# Patient Record
Sex: MALE | Race: WHITE
[De-identification: names, ages, dates, MRNs, and addresses within clinical notes are randomized per-mention and may not be internally consistent; named-entity substitution may affect disease eponyms.]

---

## 2018-09-18 ENCOUNTER — HOSPITAL ENCOUNTER (INPATIENT)
Dept: HOSPITAL 80 - FED | Age: 73
LOS: 5 days | Discharge: HOME | DRG: 271 | End: 2018-09-23
Attending: INTERNAL MEDICINE | Admitting: INTERNAL MEDICINE
Payer: COMMERCIAL

## 2018-09-18 DIAGNOSIS — E87.5: ICD-10-CM

## 2018-09-18 DIAGNOSIS — I25.10: ICD-10-CM

## 2018-09-18 DIAGNOSIS — E11.9: ICD-10-CM

## 2018-09-18 DIAGNOSIS — I21.3: Primary | ICD-10-CM

## 2018-09-18 DIAGNOSIS — Z23: ICD-10-CM

## 2018-09-18 DIAGNOSIS — E87.1: ICD-10-CM

## 2018-09-18 DIAGNOSIS — E78.5: ICD-10-CM

## 2018-09-18 DIAGNOSIS — I10: ICD-10-CM

## 2018-09-18 LAB — PLATELET # BLD: 272 10^3/UL (ref 150–400)

## 2018-09-18 PROCEDURE — C1887 CATHETER, GUIDING: HCPCS

## 2018-09-18 PROCEDURE — C1769 GUIDE WIRE: HCPCS

## 2018-09-18 PROCEDURE — G0378 HOSPITAL OBSERVATION PER HR: HCPCS

## 2018-09-18 PROCEDURE — C9600 PERC DRUG-EL COR STENT SING: HCPCS

## 2018-09-18 PROCEDURE — C1874 STENT, COATED/COV W/DEL SYS: HCPCS

## 2018-09-18 PROCEDURE — C1725 CATH, TRANSLUMIN NON-LASER: HCPCS

## 2018-09-18 RX ADMIN — CARVEDILOL SCH MG: 3.12 TABLET, FILM COATED ORAL at 18:18

## 2018-09-18 RX ADMIN — TEMAZEPAM PRN MG: 15 CAPSULE ORAL at 22:42

## 2018-09-18 RX ADMIN — AMOXICILLIN AND CLAVULANATE POTASSIUM SCH MG: 500; 125 TABLET, FILM COATED ORAL at 20:29

## 2018-09-18 RX ADMIN — INSULIN HUMAN SCH UNIT: 100 INJECTION, SOLUTION PARENTERAL at 20:40

## 2018-09-18 NOTE — PDDXCAT
Diagnostic Cath Note





- .


Date: 09/18/18


: Yoana


Indication: CCC Class III and IV angina on medical treatment





- Procedure


Access: right groin


Procedure: left heart catheterization


Patient Problems: 


 Problems











Problem Status Onset


 


Acute coronary syndrome Acute  


 


Dyspnea Acute  


 


STEMI (ST elevation myocardial infarction) Acute

## 2018-09-18 NOTE — EDPHY
H & P


Stated Complaint: SOB for 1.5 weak with intermittent CP


Time Seen by Provider: 09/18/18 12:40





- Medical/Surgical History


Hx Asthma: No


Hx Chronic Respiratory Disease: No


Hx Diabetes: No


Hx Cardiac Disease: Yes


Hx Renal Disease: No


Hx Cirrhosis: No


Hx Alcoholism: No


Hx HIV/AIDS: No


Hx Splenectomy or Spleen Trauma: No


Other PMH: DM, hyperlipidemia, CAD





- Social History


Smoking Status: Never smoked


Constitutional: 


 Initial Vital Signs











Temperature (C)  36.6 C   09/18/18 12:19


 


Heart Rate  84   09/18/18 12:19


 


Respiratory Rate  18   09/18/18 12:19


 


Blood Pressure  146/72 H  09/18/18 12:19


 


O2 Sat (%)  95   09/18/18 12:19








 











O2 Delivery Mode               Room Air














Allergies/Adverse Reactions: 


 





Sulfa (Sulfonamide Antibiotics) Allergy (Verified 09/18/18 12:18)


 


tetracycline [Tetracycline] Allergy (Verified 09/18/18 12:18)


 


contrast Allergy (Uncoded 02/05/14 09:02)


 








Home Medications: 














 Medication  Instructions  Recorded


 


AMOXICILLIN  09/18/18


 


Atorvastatin Calcium  09/18/18


 


Insulin Regular, Human  09/18/18


 


Quinapril HCl  09/18/18














Medical Decision Making





- Diagnostics


Imaging Results: 


 Imaging Impressions





Chest X-Ray  09/18/18 12:49


Impression: Features most suggestive of congestive heart failure with 

cardiogenic pulmonary edema.











Imaging: I viewed and interpreted images myself


ED Course/Re-evaluation: 





CHIEF COMPLAINT: Dyspnea 





HISTORY OF PRESENT ILLNESS:  





This patient is a 72 year old male with history of CAD, hyperlipidemia, and 

diabetes mellitus who has undergone prior cardiac catheterization in 2014. He 

complains of 10 days of worsening dyspnea with exertion. He is generally very 

active and goes jogging daily. Over the past 10 days, he states he cannot even 

walk without having to sit and recover, which is very unusual for him. He 

endorses "pushback" from his body when he tries to exert himself even mildly. 

He denies any sharp chest pains and is unable to articulate exactly what he 

means by "pushback", but does admit this is a sensation of discomfort, 

particularly in his chest. Last night, he had difficulty sleeping due to his 

dyspnea and discomfort, and he was diaphoretic at that time. He endorses 

history of non-obstructive coronary artery disease and underwent cardiac 

catheterization in February, 2014, with Dr. Mera, cardiologist. He did not 

have any stents placed at that time. The patient is currently symptom free and 

denies any shortness of breath or chest discomfort. No fever, nausea, headache, 

diarrhea, urinary complaints, or other associated symptoms. 





REVIEW OF SYSTEMS:  





A comprehensive 10 system review of systems is otherwise negative aside from 

elements mentioned in the history of present illness and medical decision 

making.





PHYSICAL EXAM:  





HR, BP, O2 Sat, RR.  Temp noted


General Appearance:  Alert, well hydrated, appropriate, and non-toxic appearing.


Head:  Atraumatic without scalp tenderness or obvious injury


Eyes:  Pupils equal, round, reactive to light and accommodation, EOMI, no trauma

, no injection.


Ears:  Clear bilaterally, no perforation, normal landmarks


Nose:  Atraumatic, no rhinorrhea, clear.


Throat:  There is no erythema or exudates, no lesions, normal tonsils, mucus 

membranes moist.


Neck:  Supple, nontender, no lymphadenopathy.


Respiratory:  No retractions, no distress, no wheezes, and no accessory muscle 

use.  Lungs are clear to auscultation bilaterally.


Cardiovascular:  Regular rate and rhythm, no murmurs, rubs, or gallops. 

Bilateral carotid, radial, dorsalis pedis, and posterior tibial pulses intact. 

Good capillary refill all extremities.


Gastrointestinal:  Abdomen is soft, nontender, non-distended.


Musculoskeletal:  Normal active ROM of all extremities, atraumatic.


Neurological:  Alert, appropriate, and interactive. Nonfocal neuro exam.


Skin:  No rashes, good turgor, no nodules on palpation.





Past medical history: CAD s/p cardiac catheterization. Hyperlipidemia. Diabetes 

mellitus. Chronotropic incompetence. 


Past surgical history: Cardiac catheterization 2/2014, no stents placed. 


Family history: Noncontributory. 


Social history: . Lives in [place]. Retired. Cardiologist: Dr. Estevez. 





DIAGNOSTICS/PROCEDURES/CRITICAL CARE TIME:  





The 12 lead EKG was interpreted by myself. Sinus rhythm. ST elevation in V3-V4, 

inverted T waves. See hard copy and/or "tracemaster" electronic copy for 

interpretation.





DIFFERENTIAL DIAGNOSIS:  





The differential diagnosis for the patient's chest pain included but was not 

limited to myocardial ischemia, pulmonary embolus, chest wall pain, pleural 

inflammation, and pulmonary infectious causes.





MEDICAL DECISION MAKING:  





This patient is a 73 y/o male with history of CAD, hyperlipidemia, and diabetes 

mellitus who presents with 10 day history of worsening exertional shortness of 

breath. He endorses an associated sensation of "pushback" in his body and 

particularly his chest, but he is reticent to describe this as "pain". Bedside 

EKG completed on arrival. 





12:41 Reviewed bedside EKG. See interpretation above, evidence of ST elevation 

representing possible acute injury. Plan for labs including CBC, chemistries, 

troponin, d-dimer. Plan for chest x-ray. 





12:47 Patient's history is suspicious for unstable angina, EKG shows evidence 

of possible ischemia. Plan to consult with cardiology. POC troponin pending. No 

old EKG is available in our system so we will request prior records from the 

[facility]. Plan to administer 324mg PO ASA. 





12:50 Reviewed prior EKG from 2/5/2014. EKG is significantly changed from this. 





13:04 POC Troponin elevated at 3.16. Plan to consult with cardiologist on call. 

As patient is currently completely asymptomatic, he does not meet criteria for 

a cardiac alert, but I suspect that we will consult with interventional 

cardiology as well following our initial cardiology consult. 





13:07 Spoke with Dr. Estevez, cardiologist. 





13:11 Dr. Lees, interventional cardiologist, is present in the emergency 

department. He will evaluate the patient now. He has reviewed the patient's EKG 

and agrees there is evidence of acute injury. 





13:20 Reviewed chest x-ray. Evidence of possible CHF vs. cardiogenic pulmonary 

edema. Radiologist report concurs. 


Reviewed remaining laboratory studies. D-dimer, BNP elevated. 





13:25 Dr. Lees with take the patient to the cath lab at this time.  





- Data Points


Laboratory Results: 


 Laboratory Results





 09/18/18 12:50 





 09/18/18 12:50 





 











  09/18/18 09/18/18 09/18/18





  12:55 12:50 12:50


 


WBC      





    


 


RBC      





    


 


Hgb      





    


 


Hct      





    


 


MCV      





    


 


MCH      





    


 


MCHC      





    


 


RDW      





    


 


Plt Count      





    


 


MPV      





    


 


Neut % (Auto)      





    


 


Lymph % (Auto)      





    


 


Mono % (Auto)      





    


 


Eos % (Auto)      





    


 


Baso % (Auto)      





    


 


Nucleat RBC Rel Count      





    


 


Absolute Neuts (auto)      





    


 


Absolute Lymphs (auto)      





    


 


Absolute Monos (auto)      





    


 


Absolute Eos (auto)      





    


 


Absolute Basos (auto)      





    


 


Absolute Nucleated RBC      





    


 


Immature Gran %      





    


 


Immature Gran #      





    


 


D-Dimer      2.65 ug/mLFEU H ug/mLFEU





     (0.00-0.50) 


 


Sodium    134 mEq/L L mEq/L  





    (135-145)  


 


Potassium    5.3 mEq/L H mEq/L  





    (3.3-5.0)  


 


Chloride    102 mEq/L mEq/L  





    ()  


 


Carbon Dioxide    26 mEq/l mEq/l  





    (22-31)  


 


Anion Gap    6 mEq/L L mEq/L  





    (8-16)  


 


BUN    24 mg/dL H mg/dL  





    (7-23)  


 


Creatinine    0.9 mg/dL mg/dL  





    (0.7-1.3)  


 


Estimated GFR    > 60   





    


 


Glucose    187 mg/dL H mg/dL  





    ()  


 


Calcium    8.3 mg/dL L mg/dL  





    (8.5-10.4)  


 


POC Troponin I  3.16 ng/mL H ng/mL    





   (0.00-0.08)   


 


NT-Pro-B Natriuret Pep    3790 pg/mL H pg/mL  





    (0-125)  














  09/18/18





  12:50


 


WBC  4.83 10^3/uL 10^3/uL





   (3.80-9.50) 


 


RBC  3.84 10^6/uL L 10^6/uL





   (4.40-6.38) 


 


Hgb  12.2 g/dL L g/dL





   (13.7-17.5) 


 


Hct  36.3 % L %





   (40.0-51.0) 


 


MCV  94.5 fL fL





   (81.5-99.8) 


 


MCH  31.8 pg pg





   (27.9-34.1) 


 


MCHC  33.6 g/dL g/dL





   (32.4-36.7) 


 


RDW  12.6 % %





   (11.5-15.2) 


 


Plt Count  272 10^3/uL 10^3/uL





   (150-400) 


 


MPV  11.1 fL fL





   (8.7-11.7) 


 


Neut % (Auto)  62.9 % %





   (39.3-74.2) 


 


Lymph % (Auto)  22.4 % %





   (15.0-45.0) 


 


Mono % (Auto)  11.4 % %





   (4.5-13.0) 


 


Eos % (Auto)  2.5 % %





   (0.6-7.6) 


 


Baso % (Auto)  0.4 % %





   (0.3-1.7) 


 


Nucleat RBC Rel Count  0.0 % %





   (0.0-0.2) 


 


Absolute Neuts (auto)  3.04 10^3/uL 10^3/uL





   (1.70-6.50) 


 


Absolute Lymphs (auto)  1.08 10^3/uL 10^3/uL





   (1.00-3.00) 


 


Absolute Monos (auto)  0.55 10^3/uL 10^3/uL





   (0.30-0.80) 


 


Absolute Eos (auto)  0.12 10^3/uL 10^3/uL





   (0.03-0.40) 


 


Absolute Basos (auto)  0.02 10^3/uL 10^3/uL





   (0.02-0.10) 


 


Absolute Nucleated RBC  0.00 10^3/uL 10^3/uL





   (0-0.01) 


 


Immature Gran %  0.4 % %





   (0.0-1.1) 


 


Immature Gran #  0.02 10^3/uL 10^3/uL





   (0.00-0.10) 


 


D-Dimer  





  


 


Sodium  





  


 


Potassium  





  


 


Chloride  





  


 


Carbon Dioxide  





  


 


Anion Gap  





  


 


BUN  





  


 


Creatinine  





  


 


Estimated GFR  





  


 


Glucose  





  


 


Calcium  





  


 


POC Troponin I  





  


 


NT-Pro-B Natriuret Pep  





  











Medications Given: 


 








Discontinued Medications





Aspirin (Aspirin)  324 mg PO EDNOW ONE


   Stop: 09/18/18 12:50


   Last Admin: 09/18/18 12:59 Dose:  324 mg





Point of Care Test Results: 


 Chemistry











  09/18/18





  12:55


 


POC Troponin I  3.16 ng/mL H ng/mL





   (0.00-0.08) 














Departure





- Departure


Disposition: [facility] Inpatient Acute


Clinical Impression: 


 Acute coronary syndrome





STEMI (ST elevation myocardial infarction)


Qualifiers:


 Involved coronary artery: unspecified coronary artery Qualified Code(s): I21.3 

- ST elevation (STEMI) myocardial infarction of unspecified site





Dyspnea


Qualifiers:


 Dyspnea type: dyspnea on exertion Qualified Code(s): R06.09 - Other forms of 

dyspnea





Condition: Fair


Referrals: 


Ki Amaral MD [Primary Care Provider] - As per Instructions


Luis Estevez MD [Medical Doctor] - As per Instructions


Report Scribed for: Bin Walton


Report Scribed by: Evelina Tsai


Date of Report: 09/18/18


Time of Report: 13:28

## 2018-09-18 NOTE — PDPROPOC
Sedation Plan of Care


Sedation Plan of Care: vital signs stable, mental status noted, patient 

educated of risks, benefits, alternatives, patient can tolerate sedation


ASA Classification: ASA 4


Planned drugs: fentanyl, midazolam


Mallampati Score: Class 2


Mallampati Reference Image: 





Patient passed 3-3-2 rule?: Yes

## 2018-09-18 NOTE — CPEKG
Test Reason : OPEN

Blood Pressure : ***/*** mmHG

Vent. Rate : 078 BPM     Atrial Rate : 078 BPM

   P-R Int : 173 ms          QRS Dur : 082 ms

    QT Int : 377 ms       P-R-T Axes : 027 -09 083 degrees

   QTc Int : 430 ms

 

Sinus rhythm

Left atrial enlargement

Anteroseptal infarct

Lateral leads are also involved

 

Confirmed by Bin Walton ([id number]),  Luis Garner ([id number]) on 9/18/2018 2:30:20 PM

 

Referred By:             Confirmed By:Bin Walton

## 2018-09-18 NOTE — PDDXCAT
Diagnostic Cath Note





- .


Date: 09/18/18


: Yoana


Indication: CCC Class III and IV angina on medical treatment





- Procedure


Access: right groin


Procedure: left heart catheterization





- Materials


Left Heart Cath size: 7F


Left Heart Cath materials: JL4.0, JR4.0, pigtail





- Findings-Left Heart Catheterization


LM: 6mm in size. Bifurcated into LAD and circumflex system. ROBBIN III flow 

throughout.


LAD: 3.25mm in size. There was a 99% occlusion in the Proximal LAD. ROBBIN I flow 

to the distal vessel. The first diagonal branch serves as the functional ramus 

vessel. Has a 15mm long 70% obstruction near its take off from the LAD.  

Downstream from the 99% obstruction is hazineess and a filling defect 

consistent with thrombus.


LCX: 3.5mm in size and is the dominant vessel. Ostial circumflex has 40% 

stenosis.


RCA: 2.0mm in size and is the nondominant vessel. There is a 30% obstruction 

near its takepff at the right cornoary cusp.


EDP: 35mmHg


LVEF: 25%


Wall motion: There is distal anterior wall and apical hypokinesis which is 

severe. The inferior wall and base of the anterior wall contracts relatively 

normally. Cannot rule out apical thrombus.





- Findings-Right Heart Catheterization


AO: 127/64/93


Complications: NONE


Estimated blood loss: <50ml


Closure method: Angioseal


Assessment: Severe native vessel coronary disease with severe obstruction of 

the LAD and thrombus consistent with recent anterior and anteroapical MI. 

Markedly elevated LVEDP with severe anterior apical hypokinesis consistent with 

recent myocardial injury.


Plan: 


Duel antiplatelet therapy with Aspirin 325mg for the first month and Aspirin 

81mg and Effient should be continued for at least 1 year following drug eluting 

stent implantation. No elective surgery for the first 3 months. Decisions to 

stop dual antiplatelet therapy before 1 year should involve our office [facility]. 


 





Intervention: 


A 7 Vietnamese JR4 guiding catheter was used for guide catheter support. A 0.014" 

Intuition Wire was advanced across the lesion in the proximal LAD and was 

stented with a 3.0 x 32mm Synergy drug eluting stent. 3.25 x 15mm Emerge 

balloon NC balloon was used to post dilate the proximal stent. There was no 

residual stenosis 0% post stent implantation with improvement in flow from ROBBIN 

I to ROBBNI III flow post stent implantation. An intraortic balloon pump was then 

inserted and placed at the descending portion of the thoracic aorta just distal 

the to left subclavian takeoff and was noted to be augmenting properly.  








Patient Problems: 


 Problems











Problem Status Onset


 


Acute coronary syndrome Acute  


 


Dyspnea Acute  


 


STEMI (ST elevation myocardial infarction) Acute

## 2018-09-18 NOTE — PDHPUP
History & Physical Update


H&P update statement: 


This history and physical update is based on an assessment of the patient which 

was completed after admission or registration (within 24 hours), but prior to 

the surgery/procedure.





H&P update: H&P reviewed & patient examined (pt with recent MI 7-10 days ago ), 

no change in patient's condition since H&P completed

## 2018-09-19 LAB — PLATELET # BLD: 260 10^3/UL (ref 150–400)

## 2018-09-19 RX ADMIN — INSULIN GLARGINE SCH UNITS: 100 INJECTION, SOLUTION SUBCUTANEOUS at 20:45

## 2018-09-19 RX ADMIN — ACETAMINOPHEN PRN MG: 325 TABLET ORAL at 03:08

## 2018-09-19 RX ADMIN — AMOXICILLIN AND CLAVULANATE POTASSIUM SCH MG: 500; 125 TABLET, FILM COATED ORAL at 13:42

## 2018-09-19 RX ADMIN — INSULIN LISPRO SCH: 100 INJECTION, SOLUTION INTRAVENOUS; SUBCUTANEOUS at 17:45

## 2018-09-19 RX ADMIN — ONDANSETRON PRN MG: 2 SOLUTION INTRAMUSCULAR; INTRAVENOUS at 19:13

## 2018-09-19 RX ADMIN — PRASUGREL HYDROCHLORIDE SCH MG: 10 TABLET, FILM COATED ORAL at 10:10

## 2018-09-19 RX ADMIN — INSULIN HUMAN SCH: 100 INJECTION, SOLUTION PARENTERAL at 07:56

## 2018-09-19 RX ADMIN — LISINOPRIL SCH MG: 5 TABLET ORAL at 11:57

## 2018-09-19 RX ADMIN — OXYCODONE HYDROCHLORIDE AND ACETAMINOPHEN PRN TAB: 5; 325 TABLET ORAL at 23:37

## 2018-09-19 RX ADMIN — AMOXICILLIN AND CLAVULANATE POTASSIUM SCH MG: 500; 125 TABLET, FILM COATED ORAL at 20:45

## 2018-09-19 RX ADMIN — INSULIN HUMAN SCH: 100 INJECTION, SOLUTION PARENTERAL at 12:21

## 2018-09-19 RX ADMIN — ROSUVASTATIN CALCIUM SCH MG: 20 TABLET, FILM COATED ORAL at 10:10

## 2018-09-19 RX ADMIN — ACETAMINOPHEN PRN MG: 325 TABLET ORAL at 13:41

## 2018-09-19 RX ADMIN — EPLERENONE SCH MG: 25 TABLET ORAL at 11:58

## 2018-09-19 RX ADMIN — ASPIRIN SCH MG: 325 TABLET, DELAYED RELEASE ORAL at 10:10

## 2018-09-19 RX ADMIN — CARVEDILOL SCH MG: 3.12 TABLET, FILM COATED ORAL at 17:51

## 2018-09-19 RX ADMIN — CARVEDILOL SCH MG: 3.12 TABLET, FILM COATED ORAL at 10:37

## 2018-09-19 RX ADMIN — TEMAZEPAM PRN MG: 15 CAPSULE ORAL at 20:45

## 2018-09-19 RX ADMIN — AMOXICILLIN AND CLAVULANATE POTASSIUM SCH MG: 500; 125 TABLET, FILM COATED ORAL at 06:23

## 2018-09-19 RX ADMIN — INSULIN GLARGINE SCH: 100 INJECTION, SOLUTION SUBCUTANEOUS at 19:16

## 2018-09-19 RX ADMIN — OXYCODONE HYDROCHLORIDE AND ACETAMINOPHEN PRN TAB: 5; 325 TABLET ORAL at 14:29

## 2018-09-19 NOTE — PDMN
Medical Necessity


Medical necessity: MCG M230 MI: 71 yo with acute MI (STEMI), taken to cath lab, 

on balloon pump

## 2018-09-19 NOTE — GCON
CRITICAL CARE CONSULTATION



DATE OF CONSULTATION:  09/19/2018





REASON FOR CONSULTATION:  Intensive care unit evaluation and medical management following myocardial 
infarction with cardiogenic shock.



HISTORY:  The patient is a very pleasant 72-year-old with known coronary artery disease.  He had a ca
theterization in 2014, but disease was nonobstructive and he did not require stenting.  Over the last
 week or so, he has had increasing shortness of breath, dyspnea on exertion, and chest discomfort.  T
his was more significant yesterday.  He drove himself to the emergency department.  He was evaluated 
by Cardiology and taken urgently to the cath lab by Dr. Lees.  He had obstructive disease in a numb
er of vessels including the LAD.  Stenting of the proximal LAD was accomplished.  Severe anteroapical
 hypokinesis was present with an ejection fraction of 25%.  An intra-aortic balloon pump was placed. 
 The patient was returned to the intensive care unit in stable condition on the balloon pump at 1:1. 
 



He has remained stable on the balloon pump overnight.  He complains of some back pain, no chest pain,
 no shortness of breath.  Oxygen is in place at 4 L.



PAST MEDICAL HISTORY:  Remarkable for insulin requiring diabetes mellitus and coronary artery disease
.  There is a history of hyperlipidemia and systemic hypertension.



HOME MEDICATIONS:  Baby aspirin, Augmentin recently, Lipitor, Accupril, and insulin, both NovoLog and
 Lantus.  He adjusts his insulin based on glucose readings.



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  Si
gnificant alcohol is negative.



FAMILY HISTORY:  Noncontributory at this time.



REVIEW OF SYSTEMS:  A 10-point review of systems is negative except as mentioned above.



PHYSICAL EXAMINATION:  GENERAL:  Reveals a pleasant gentleman who is lying flat on his back in the in
tensive care unit.  Intra-aortic balloon pump is in place at 1:1.  Oxygen is in place by nasal cannul
a at 4 L.  Blood pressure is 115/45, heart rate 80 with sinus rhythm on the monitor.  Respiratory rat
e is 24.  On 4 L oxygen saturations are 98%.  He is afebrile.  HEENT:  Unremarkable for lymphadenopat
hy or thyromegaly.  Jugular venous distention does not appear to be present.  CHEST:  Clear bilateral
ly.  Breath sounds and excursions are both excellent.  HEART:  Regular in rate and rhythm.  The ballo
on pump is present 1:1, however, peripheral pulse indicates that augmentation is 2 times this.  Ballo
on pump noise is present.  A gallop cannot be appreciated.  ABDOMEN:  Soft, nontender.  Bowel sounds 
are present.  A Mendenhall catheter is in place.  Residuals were quite large prior to its placement.  Urin
e output is excellent.  EXTREMITIES:  Unremarkable for edema, cords, or tenderness.  Lower extremity 
perfusion is intact bilaterally.  NEUROLOGIC:  Examination is within normal limits.



LABORATORY DATA:  White blood cell count is 5600, hematocrit 33.9, platelets are 260,000.  Unfraction
ated heparin is 0.81.  Sodium is 133, potassium 4.3, BUN 23 with a creatinine of 0.9.  Glucose is 213
.  Calcium is 7.9.  LFTs are within normal limits.  Troponin is 2.36 this morning.  BNP is 4200, albu
min 2.3.



ASSESSMENT:  

1.  Acute myocardial infarction associated with cardiogenic shock.  He had 99% occlusion of the LAD. 
 This was successfully stented.  Intra-aortic balloon pump support is being required.  He is on anti-
platelet agents including aspirin, Effient and heparin drip.  He is on low-dose lisinopril as well as
 carvedilol.  He is doing well.  Cardiac echo is being performed. 

2.  Insulin-dependent diabetes mellitus.  He is on NovoLog and Lantus at home.  These will be continu
ed per his usual doses.

3.  History of systemic hypertension and hyperlipidemia. 

4.  Deep venous thrombosis prophylaxis:  On heparin and anti-platelet agents.

5.  Gastrointestinal prophylaxis:  None indicated, eating.



PLAN AND RECOMMENDATIONS:  Supportive care will be maintained in the intensive care unit.  Intraaorti
c balloon pump will be continued and weaned eventually per instructions from Cardiology.  Antiplatele
t agents and systemic anticoagulation will be continued.  Insulin will be given per the patient's u
al home regimen.  Other routine medications that he came in on will be continued, including amoxicill
in. 



Further plans and recommendations will be made based on his progress over the next 12-24 hours.





Job #:  [id number]

## 2018-09-19 NOTE — ECHO
[email or web address]





[address] [address] 

Main: [phone] 



Fax: 



Transthoracic Echocardiogram 

Name:             LAMBERT OLIVAREZ                         MR#:

[id number]

Study Date:       2018                             Study Time:

09:39 AM

YOB: 1945                             Age:

72 year(s)

Height:           182.9 cm (72 in.)                      Weight:

78.02 kg (172 lb.)

BSA:              2 m2                                   Gender:

Male

Examination:      Echo with Definity                     Indication:

definity to rule out apical clot

Image Quality:    Technically Difficult                  Contrast:

0.330 mg  I.V. dose of Definity was



administered to improve endocardial border 

definition. 



Requested by:     Jake Lees                           BP:

106 mmHg/39 mmHg

Heart Rate:                                              Rhythm: 

Indication:       definity to rule out apical clot 



Procedure Staff 

Ultrasound Technician:   Amber Husain [facility] 

Reading Physician:       Jake Lees MD 

Requesting Provider: 



Conclusions:          Grossly normal LV size by volume with mild to

moderately reduced LV function. Large apical

hypokinesis extending to distal anterior wall. Definity used to rule

out apical thrombus. No large

apical thrombus noted however in the apical 2 chamber it is difficult

to definitively rule out. Estimated

ejection fraction 40-45% on balloon pump.  

RV best visualized in the subcostal views. Normal size and function in

these limited views.

There is mild thickening of the mitral valve leaflets.  

There is no significant mitral valve regurgitation.  

No mitral stenosis is present.  

Aortic valve is not well visualized.  

Aortic sclerosis is present.  

There is no aortic valve regurgitation.  

No aortic valve stenosis is present.  

Tricuspid valve not visualized.  

There is no significant tricuspid valve regurgitation.  

Pulmonary valve not well visualized.  

No pericardial effusion.  

These findings are consistent with a relatively extensive anteroapical

myocardial infarction. Consider

anticoagulation for the first 6 months post MI and reassess to help

decrease the risk of apical

thrombus. 



Measurements: 

Chambers                    Valvular Assessment AV/MV

Valvular Assessment TV/PV



Normal                                   Normal

Normal

Name         Value     Range              Name         Value Range

Name          Value Range

LVEF (BP):   44 %      (>=55 %)           AV Vmax:     0.96 m/s (1

m/s-1.7



m/s)  

AV maxP mmHg ( - )  



Patient: LAMBERT OLIVAREZ                       MRN: [id number]

Study Date: 2018   Page 1 of 2

09:39 AM 









LVOT Vmax: 1.00 m/s (0.7 m/s-1.1 

m/s)  



MV E Vmax:   1.02 m/s ( - )  

MV A Vmax:   1.02 m/s ( - )  

MV E/A:      1.00 ( - )  



Continued Measurements: 

Valvular Assessment AV/MV  



Name                      Value  

MV DecTime:            116 m/s   



Findings:             Left Ventricle: 

Grossly normal LV size by volume with mild to moderately reduced LV

function. Large apical

hypokinesis extending to distal anterior wall. Definity used to rule

out apical thrombus. No large

apical thrombus noted however in the apical 2 chamber it is difficult

to definitively rule out. Estimated

ejection fraction 40-45% on balloon pump.  

Right Ventricle: 

RV best visualized in the subcostal views. Normal size and function in

these limited views.

Mitral Valve: 

There is mild thickening of the mitral valve leaflets. There is no

significant mitral valve regurgitation.

No mitral stenosis is present.  

Aortic Valve: 

Aortic valve is not well visualized. Aortic sclerosis is present.

There is no aortic valve regurgitation.

No aortic valve stenosis is present.  

Tricuspid Valve: 

Tricuspid valve not visualized. There is no significant tricuspid

valve regurgitation.

Pulmonic Valve: 

Pulmonary valve not well visualized.  

Pericardium: 

No pericardial effusion. 







Electronically signed by Jake Lees MD on 2018 at 03:15 PM 

(No Signature Object) 



Patient: LAMBERT OLIVAREZ                       MRN: [id number]

Study Date: 2018   Page 2 of 2

09:39 AM 







[id number]

## 2018-09-19 NOTE — ASMTCMCOM
CM Note

 

CM Note                       

Notes:

72yr old male admitted for an acute MI. He has a Hx of ACS, CAD, Dyspnea, HTN, DM-1, forner 

smoker. Patient went to the cath lab found to have occluded LAD . Patient was stented and on the 

bolloon pump. Lives with his wife. CM to follow, may not have discharge needs.

 

Date Signed:  09/19/2018 03:40 PM

Electronically Signed By:Johanna Dobson LCSW

## 2018-09-19 NOTE — PDCARPN
Cardiology Progress Note


Chief Complaint: 


I feel a little bored lying here and my back hurts.


Assessment/Plan: 


Assessment:1.status post recent anterior lateral and apical myocardial 

infarction with late presentation. The patient probably had an anterior MI 7-10 

days ago while on the train from [place]. He had a delayed presentation 

and finally presented for ongoing fatigue. He underwent successful balloon 

angioplasty and stent implantation to the LAD. He is currently on intraaortic 

balloon support on 1:1. 


The patient has been started on beta blocker, aldosterone antagonist to help 

reduce negative remodeling. He does have serologic evidence of heart failure on 

the basis of a BMP which was over 4000, his troponin continues to trend down. 

Plan to ween the patient off his intraaortic balloon pump and the heparin drip 

will be held. Removal of the Balloon pump is intended if the ween is tolerated. 


2. Diabetes Mellitus will be treated under the care of the intensivist. 


3. No evidence of apical thrombus on echo. Plan for full dose anticoagulation 

once the balloon pump has been removed. 























Plan:I would like the patient to be on full dose anticoagulation following the 

IABP wean.





09/19/18 19:05





09/19/18 19:16





Reviewed/Discussed With: family


Time Spent with Patient: greater than 25 minutes


Time Spent with Patient: Greater than 25 minutes spent on this patients care, 

greater than 50% of time spent counseling, educating, and coordinating care 

regarding the above mentioned plan.


Objective: 





 Vital Signs (8 Hrs)











  Pulse Resp BP Pulse Ox


 


 09/19/18 17:54  73   123/69 H 


 


 09/19/18 17:51  69   127/44 H 


 


 09/19/18 16:21  68  18  118/40 L  100


 


 09/19/18 16:00  68  18  122/36 H  95


 


 09/19/18 15:00  67  22 H  126/53 H  100








 Intake/Output (24 Hrs)











 09/18/18 09/19/18 09/20/18





 05:59 05:59 05:59


 


Intake Total   2461


 


Output Total   900


 


Balance   1561


 


Intake:   


 


  Oral (ml)   950


 


  IV Intake (ml)   753


 


  IV Infused (ml)   758


 


    Heparin/Dextrose 500 ml @   758





    Per Protocol IV CONT COLLEEN   





    Rx#:[id number]   


 


Output:   


 


  Urine (ml)   900


 


    Catheter   900











Result Diagrams: 


 09/19/18 02:50





 09/19/18 08:45





- Physical Exam


Constitutional: WDWN, no apparent distress


Eyes: PERRL, anicteric sclera


Ears, Nose, Mouth, Throat: moist mucous membranes


Cardiovascular: regular rate and rhythm, systolic murmur


Respiratory: clear to auscultate bilat, No expiratory wheeze, No inspiratory 

crackles


Gastrointestinal: normoactive bowel sounds


Genitourinary: no suprapubic tenderness


Skin: no rashes, warm, other (nosignificant hematoma or bruising at IABP 

insertion site.)


Psychiatric: cooperative, interactive, following commands





- .


Pending Discharge Within 24 Hours: No


Pending Discharge Within 48 Hours: No





ICD10 Worksheet


Patient Problems: 


 Problems











Problem Status Onset


 


Acute coronary syndrome Acute  


 


Dyspnea Acute  


 


STEMI (ST elevation myocardial infarction) Acute

## 2018-09-20 LAB — PLATELET # BLD: 286 10^3/UL (ref 150–400)

## 2018-09-20 RX ADMIN — AMOXICILLIN AND CLAVULANATE POTASSIUM SCH MG: 500; 125 TABLET, FILM COATED ORAL at 21:45

## 2018-09-20 RX ADMIN — INSULIN LISPRO SCH UNITS: 100 INJECTION, SOLUTION INTRAVENOUS; SUBCUTANEOUS at 14:15

## 2018-09-20 RX ADMIN — EPLERENONE SCH MG: 25 TABLET ORAL at 09:06

## 2018-09-20 RX ADMIN — PRASUGREL HYDROCHLORIDE SCH MG: 10 TABLET, FILM COATED ORAL at 09:09

## 2018-09-20 RX ADMIN — INSULIN GLARGINE SCH UNITS: 100 INJECTION, SOLUTION SUBCUTANEOUS at 06:07

## 2018-09-20 RX ADMIN — AMOXICILLIN AND CLAVULANATE POTASSIUM SCH MG: 500; 125 TABLET, FILM COATED ORAL at 06:07

## 2018-09-20 RX ADMIN — INSULIN LISPRO SCH UNITS: 100 INJECTION, SOLUTION INTRAVENOUS; SUBCUTANEOUS at 19:26

## 2018-09-20 RX ADMIN — INSULIN GLARGINE SCH UNITS: 100 INJECTION, SOLUTION SUBCUTANEOUS at 21:05

## 2018-09-20 RX ADMIN — OXYCODONE HYDROCHLORIDE AND ACETAMINOPHEN PRN TAB: 5; 325 TABLET ORAL at 07:32

## 2018-09-20 RX ADMIN — AMOXICILLIN AND CLAVULANATE POTASSIUM SCH MG: 500; 125 TABLET, FILM COATED ORAL at 14:15

## 2018-09-20 RX ADMIN — ASPIRIN SCH MG: 325 TABLET, DELAYED RELEASE ORAL at 09:08

## 2018-09-20 RX ADMIN — LISINOPRIL SCH MG: 5 TABLET ORAL at 09:07

## 2018-09-20 RX ADMIN — ROSUVASTATIN CALCIUM SCH MG: 20 TABLET, FILM COATED ORAL at 09:06

## 2018-09-20 RX ADMIN — CARVEDILOL SCH MG: 3.12 TABLET, FILM COATED ORAL at 07:34

## 2018-09-20 RX ADMIN — ONDANSETRON PRN MG: 2 SOLUTION INTRAMUSCULAR; INTRAVENOUS at 20:26

## 2018-09-20 RX ADMIN — CARVEDILOL SCH MG: 3.12 TABLET, FILM COATED ORAL at 19:03

## 2018-09-20 RX ADMIN — INSULIN LISPRO SCH UNITS: 100 INJECTION, SOLUTION INTRAVENOUS; SUBCUTANEOUS at 07:35

## 2018-09-20 NOTE — CPEKG
Test Reason : OPEN

Blood Pressure : ***/*** mmHG

Vent. Rate : 080 BPM     Atrial Rate : 080 BPM

   P-R Int : 180 ms          QRS Dur : 083 ms

    QT Int : 381 ms       P-R-T Axes : 037 -02 091 degrees

   QTc Int : 440 ms

 

Sinus rhythm

Probable left atrial enlargement

Anteroseptal infarct, age indeterminate

Minimal ST elevation, inferior leads

 

Confirmed by Jae Lopez ([id number]) on 9/20/2018 11:56:15 AM

 

Referred By:             Confirmed By:Jae Lopez

## 2018-09-20 NOTE — PDINTPN
Intensivist Progress Note


Assessment/Plan: 


Assessment:





Status post anterior wall myocardial infarction, cardiogenic shock.  Improved.  

Intra-aortic balloon pump removed today.  Echo yesterday estimated ejection 

fraction to possibly be 45%, up from 25% on admission.  On carvedilol, 

lisinopril, aspirin, Effient, and IV heparin.





Insulin-dependent diabetes:  Doing well.  He is on NovoLog and Lantus based on 

what he would take at home.  Glucoses acceptable





History of hypertension, hyperlipidemia:  Stable





GI prophylaxis:  Eating





Pain control:  Adequate.  Will improve post IABP removal and he can be 

mobilized later today.





Metabolic:  Mild hyponatremia at 132, hyperkalemia at 5.5.  Renal function 

normal.  If potassium continues to climb will stop Inspra. Will follow














Plan:  Continue care in the intensive care unit.  Continue cardiac medications 

as above.  Bed rest until about 6 hr after IABP pulled, then began to mobilize.

  Continue insulin comma dosing per patient.  Follow laboratory this afternoon 

and in a.m..





30 min of critical care time spent with the patient.  Discussed with Cardiology

, nursing, and the ICU multi disciplinary team.


























Subjective: 





Sedated secondary to Versed for IABP removal.  No specific complaints.


Objective: 





 Vital Signs











Temp Pulse Resp BP Pulse Ox


 


 36.6 C   69   22 H  110/53 L  94 


 


 09/20/18 07:00  09/20/18 11:00  09/20/18 11:00  09/20/18 11:00  09/20/18 11:00








 Laboratory Results





 09/20/18 05:10 





 09/20/18 05:10 





 











 09/19/18 09/20/18 09/21/18





 05:59 05:59 05:59


 


Intake Total  3773 


 


Output Total  1640 


 


Balance  2133 














Physical Exam





- Physical Exam


General Appearance: no apparent distress, other (Sleepy, arouses, responsive)


EENT: PERRL/EOMI, other (Nasal cannula in place at 3-4 L)


Neck: normal inspection (No obvious JVD)


Respiratory: lungs clear, decreased breath sounds (At bases)


Cardiac/Chest: regular rate, rhythm


Abdomen: normal bowel sounds, non-tender, soft


Male Genitalia: other (Mendenhall catheter in place, good urine output)


Skin: normal color, warm/dry


Extremities: No pedal edema


Neuro/Psych: no motor/sensory deficits, No cognition abnormalities





ICD10 Worksheet


Patient Problems: 


 Problems











Problem Status Onset


 


Acute coronary syndrome Acute  


 


STEMI (ST elevation myocardial infarction) Acute  


 


Dyspnea Acute

## 2018-09-21 LAB — PLATELET # BLD: 293 10^3/UL (ref 150–400)

## 2018-09-21 RX ADMIN — EPLERENONE SCH MG: 25 TABLET ORAL at 08:07

## 2018-09-21 RX ADMIN — INSULIN GLARGINE SCH UNITS: 100 INJECTION, SOLUTION SUBCUTANEOUS at 20:39

## 2018-09-21 RX ADMIN — CARVEDILOL SCH MG: 3.12 TABLET, FILM COATED ORAL at 08:06

## 2018-09-21 RX ADMIN — AMOXICILLIN AND CLAVULANATE POTASSIUM SCH MG: 500; 125 TABLET, FILM COATED ORAL at 14:15

## 2018-09-21 RX ADMIN — AMOXICILLIN AND CLAVULANATE POTASSIUM SCH MG: 500; 125 TABLET, FILM COATED ORAL at 05:32

## 2018-09-21 RX ADMIN — INSULIN LISPRO SCH UNITS: 100 INJECTION, SOLUTION INTRAVENOUS; SUBCUTANEOUS at 13:23

## 2018-09-21 RX ADMIN — INSULIN LISPRO SCH UNITS: 100 INJECTION, SOLUTION INTRAVENOUS; SUBCUTANEOUS at 18:22

## 2018-09-21 RX ADMIN — ASPIRIN SCH MG: 325 TABLET, DELAYED RELEASE ORAL at 08:06

## 2018-09-21 RX ADMIN — LISINOPRIL SCH MG: 5 TABLET ORAL at 08:07

## 2018-09-21 RX ADMIN — AMOXICILLIN AND CLAVULANATE POTASSIUM SCH MG: 500; 125 TABLET, FILM COATED ORAL at 22:25

## 2018-09-21 RX ADMIN — INSULIN LISPRO SCH UNITS: 100 INJECTION, SOLUTION INTRAVENOUS; SUBCUTANEOUS at 11:38

## 2018-09-21 RX ADMIN — INSULIN GLARGINE SCH UNITS: 100 INJECTION, SOLUTION SUBCUTANEOUS at 08:06

## 2018-09-21 RX ADMIN — INSULIN LISPRO SCH UNITS: 100 INJECTION, SOLUTION INTRAVENOUS; SUBCUTANEOUS at 08:06

## 2018-09-21 RX ADMIN — ROSUVASTATIN CALCIUM SCH MG: 20 TABLET, FILM COATED ORAL at 08:07

## 2018-09-21 RX ADMIN — CARVEDILOL SCH MG: 3.12 TABLET, FILM COATED ORAL at 18:20

## 2018-09-21 RX ADMIN — PRASUGREL HYDROCHLORIDE SCH MG: 10 TABLET, FILM COATED ORAL at 08:06

## 2018-09-21 NOTE — PDCARPN
Cardiology Progress Note


Assessment/Plan: 


Assessment:1.status post recent anterior lateral and apical myocardial 

infarction with late presentation. The patient probably had an anterior MI 7-10 

days ago while on the train from [place]. He had a delayed presentation 

and finally presented for ongoing fatigue. He underwent successful balloon 

angioplasty and stent implantation to the LAD. He is currently on intraaortic 

balloon support on 1:1. 


The patient has been started on beta blocker, aldosterone antagonist to help 

reduce negative remodeling. He does have serologic evidence of heart failure on 

the basis of a BMP which was over 4000, his troponin continues to trend down. 

Plan to ween the patient off his intraaortic balloon pump and the heparin drip 

will be held. Removal of the Balloon pump is intended if the ween is tolerated. 


2. Diabetes Mellitus will be treated under the care of the intensivist. 


3. No evidence of apical thrombus on echo. Plan for full dose anticoagulation 

once the balloon pump has been removed. 





INTERIM SUMMARY:





I SAW THE PATIENT YESTERDAY AND SPENT OVER 35 MINUTES OF BEDSIDE TIME IN THE 

PROCESS OF REMOVING THE PATIENT'S BALLOON PUMP. 





TODAY HE HAS BEEN DOWNGRADED TO TELE STATUS AND HAS NOT HAD DYSRHYTHMIA. WE 

PLAN TO START FULL DOSE ANTICOAGULATION FOR INDICATION OF ANTERIOR AND APICAL 

AKINESIS AND CONCERN FOR THE DEVELOPMENT OF AN APICAL THROMBUS. 





COUMADIN IS INDICATED FOR AT LEAST THE NEXT 6 MONTHS.























Plan: AS ABOVE





09/19/18 19:05





09/19/18 19:16





09/21/18 19:05





Objective: 





 Vital Signs (8 Hrs)











  Temp Pulse Resp BP Pulse Ox


 


 09/21/18 18:20     120/64 


 


 09/21/18 18:18      92


 


 09/21/18 17:36      89 L


 


 09/21/18 16:00  36.9 C  70  20  105/58 L  90 L


 


 09/21/18 13:05  36.7 C  66  17  104/69  90 L


 


 09/21/18 11:33  37.1 C  68  20  105/51 L  95








 Intake/Output (24 Hrs)











 09/20/18 09/21/18 09/22/18





 05:59 05:59 05:59


 


Intake Total 3773 500 


 


Output Total 1640 1230 200


 


Balance 2133 -730 -200


 


Intake:   


 


  Oral (ml) 1250 500 


 


  IV Intake (ml) 1765  


 


  IV Infused (ml) 758  


 


    Heparin/Dextrose 500 ml @ 758  





    Per Protocol IV CONT COLLEEN   





    Rx#:[id number]   


 


Output:   


 


  Urine (ml) 1640 1230 200


 


    Catheter 1640 1230 


 


    Urinal   200


 


Other:   


 


  Weight 82.9 kg  


 


  Intake Quantity   Yes





  Sufficient   


 


  Number of Stools   


 


    Catheter  0 











Result Diagrams: 


 09/21/18 05:35





 09/21/18 05:35





- Physical Exam


Constitutional: no apparent distress


Eyes: EOMI, anicteric sclera


Ears, Nose, Mouth, Throat: moist mucous membranes


Cardiovascular: regular rate and rhythm, systolic murmur


Respiratory: clear to auscultate bilat


Gastrointestinal: normoactive bowel sounds


Neurologic: AAOx3, CN II-XII grossly intact


Psychiatric: cooperative, interactive





ICD10 Worksheet


Patient Problems: 


 Problems











Problem Status Onset


 


Acute coronary syndrome Acute  


 


Dyspnea Acute  


 


STEMI (ST elevation myocardial infarction) Acute  


 


chronic disease mgmt/transitional care Acute

## 2018-09-21 NOTE — PDINTPN
Intensivist Progress Note


Assessment/Plan: 


Assessment:





Status post anterior wall myocardial infarction, cardiogenic shock.  Improved.  

Intra-aortic balloon pump removed 9/20.  Follow-up Echo estimated ejection 

fraction to possibly be 45%, up from 25% on admission.  On carvedilol, 

lisinopril, aspirin, Effient.





Insulin-dependent diabetes:  Doing well.  He is on NovoLog and Lantus based on 

what he would take at home.  Glucoses acceptable





History of hypertension, hyperlipidemia:  Stable





GI prophylaxis:  Eating





Pain control:  Doing well.  Much more comfortable now.





Metabolic:  Mild hyponatremia at 131, hyperkalemia at 5.5.  Renal function 

normal.  Will stop Inspra. 














Plan:  Continue care.  Continue cardiac medications as above.  Hold Inspra.  

Follow electrolytes.  Can transfer to PCU if okay with Cardiology








25 min of critical care time spent with the patient.  

















Subjective: 





Doing well.  Feels better.  Up ambulating.  Denies chest pain.


Objective: 





 Vital Signs











Temp Pulse Resp BP Pulse Ox


 


 36.7 C   66   17   104/69   90 L


 


 09/21/18 13:05  09/21/18 13:05  09/21/18 13:05  09/21/18 13:05  09/21/18 13:05








 Laboratory Results





 09/21/18 05:35 





 09/21/18 05:35 





 











 09/20/18 09/21/18 09/22/18





 05:59 05:59 05:59


 


Intake Total 3773 500 


 


Output Total 1640 1230 


 


Balance 2133 -730 














Physical Exam





- Physical Exam


General Appearance: alert, no apparent distress


EENT: PERRL/EOMI, other (Nasal cannula in place at 2-3 L)


Neck: normal inspection (No JVD)


Respiratory: lungs clear (Anteriorly), decreased breath sounds (At bases), No 

rales, No rhonchi


Cardiac/Chest: regular rate, rhythm, gallop (Soft gallop?), No systolic murmur


Abdomen: normal bowel sounds, non-tender, soft


Male Genitalia: other (Mendenhall catheter in place, to be removed.)


Skin: normal color, warm/dry


Extremities: pedal edema (Trace)


Neuro/Psych: no motor/sensory deficits, No cognition abnormalities





ICD10 Worksheet


Patient Problems: 


 Problems











Problem Status Onset


 


chronic disease mgmt/transitional care Acute  


 


Acute coronary syndrome Acute  


 


STEMI (ST elevation myocardial infarction) Acute  


 


Dyspnea Acute

## 2018-09-21 NOTE — ASMTCMCOM
CM Note

 

CM Note                       

Notes:

No therapies ordered for patient. Patient to d/c independently. CM available should d/c needs 

arise.

 

Date Signed:  09/21/2018 11:33 AM

Electronically Signed By:Iveth Wakefield LCSW

## 2018-09-22 LAB
INR PPP: 1.13 (ref 0.83–1.16)
PROTHROMBIN TIME: 14.7 SEC (ref 12–15)

## 2018-09-22 RX ADMIN — INSULIN GLARGINE SCH UNITS: 100 INJECTION, SOLUTION SUBCUTANEOUS at 21:01

## 2018-09-22 RX ADMIN — AMOXICILLIN AND CLAVULANATE POTASSIUM SCH MG: 500; 125 TABLET, FILM COATED ORAL at 10:47

## 2018-09-22 RX ADMIN — AMOXICILLIN AND CLAVULANATE POTASSIUM SCH: 500; 125 TABLET, FILM COATED ORAL at 20:55

## 2018-09-22 RX ADMIN — LISINOPRIL SCH MG: 5 TABLET ORAL at 08:39

## 2018-09-22 RX ADMIN — ENOXAPARIN SODIUM SCH MG: 100 INJECTION SUBCUTANEOUS at 10:47

## 2018-09-22 RX ADMIN — CARVEDILOL SCH MG: 3.12 TABLET, FILM COATED ORAL at 16:45

## 2018-09-22 RX ADMIN — INSULIN LISPRO SCH UNITS: 100 INJECTION, SOLUTION INTRAVENOUS; SUBCUTANEOUS at 11:40

## 2018-09-22 RX ADMIN — ASPIRIN SCH MG: 325 TABLET, DELAYED RELEASE ORAL at 08:39

## 2018-09-22 RX ADMIN — ENOXAPARIN SODIUM SCH MG: 100 INJECTION SUBCUTANEOUS at 21:00

## 2018-09-22 RX ADMIN — CARVEDILOL SCH MG: 3.12 TABLET, FILM COATED ORAL at 08:40

## 2018-09-22 RX ADMIN — PRASUGREL HYDROCHLORIDE SCH MG: 10 TABLET, FILM COATED ORAL at 08:40

## 2018-09-22 RX ADMIN — ROSUVASTATIN CALCIUM SCH MG: 20 TABLET, FILM COATED ORAL at 08:39

## 2018-09-22 RX ADMIN — INSULIN LISPRO SCH UNITS: 100 INJECTION, SOLUTION INTRAVENOUS; SUBCUTANEOUS at 08:38

## 2018-09-22 RX ADMIN — INSULIN LISPRO SCH UNITS: 100 INJECTION, SOLUTION INTRAVENOUS; SUBCUTANEOUS at 10:40

## 2018-09-22 RX ADMIN — INSULIN LISPRO SCH UNITS: 100 INJECTION, SOLUTION INTRAVENOUS; SUBCUTANEOUS at 18:35

## 2018-09-22 RX ADMIN — AMOXICILLIN AND CLAVULANATE POTASSIUM SCH: 500; 125 TABLET, FILM COATED ORAL at 16:44

## 2018-09-22 RX ADMIN — WARFARIN SODIUM SCH MG: 5 TABLET ORAL at 16:44

## 2018-09-22 RX ADMIN — INSULIN GLARGINE SCH UNITS: 100 INJECTION, SOLUTION SUBCUTANEOUS at 08:35

## 2018-09-22 NOTE — PDCARPN
Cardiology Progress Note


Assessment/Plan: 


Assessment:1.status post recent anterior lateral and apical myocardial 

infarction with late presentation. The patient probably had an anterior MI 7-10 

days ago while on the train from [place]. He had a delayed presentation 

and finally presented for ongoing fatigue. He underwent successful balloon 

angioplasty and stent implantation to the LAD. He is currently on intraaortic 

balloon support on 1:1. 


The patient has been started on beta blocker, aldosterone antagonist to help 

reduce negative remodeling. He does have serologic evidence of heart failure on 

the basis of a BMP which was over 4000, his troponin continues to trend down. 

Plan to ween the patient off his intraaortic balloon pump and the heparin drip 

will be held. Removal of the Balloon pump is intended if the ween is tolerated. 


2. Diabetes Mellitus will be treated under the care of the intensivist. 


3. No evidence of apical thrombus on echo. Plan for full dose anticoagulation 

once the balloon pump has been removed. 





INTERIM SUMMARY:





I SAW THE PATIENT YESTERDAY AND SPENT OVER 35 MINUTES OF BEDSIDE TIME IN THE 

PROCESS OF REMOVING THE PATIENT'S BALLOON PUMP. 





TODAY HE HAS BEEN DOWNGRADED TO TELE STATUS AND HAS NOT HAD DYSRHYTHMIA. WE 

PLAN TO START FULL DOSE ANTICOAGULATION FOR INDICATION OF ANTERIOR AND APICAL 

AKINESIS AND CONCERN FOR THE DEVELOPMENT OF AN APICAL THROMBUS. 





COUMADIN IS INDICATED FOR AT LEAST THE NEXT 6 MONTHS.





9/22/2018 





The patient is walking in the hallway without pain in the chest or undue 

shortness of breath. The pt will be started on Lovenox in full dose today. I 

want to switch him back to Atorvastatin in preparation for possible discharge 

tomorrow. The patient will need to be enrolled in cardiac rehab. He will need 

to be enrolled in [facility]. He is somewhat hyponatremic on todays labs 

and those will need to be repeated. 























Plan: AS ABOVE





09/19/18 19:05





09/19/18 19:16





09/21/18 19:05





09/22/18 11:20





09/22/18 11:23





Reviewed/Discussed With: family, hospitalist, multidisciplinary team


Time Spent with Patient: greater than 25 minutes


Time Spent with Patient: Greater than 25 minutes spent on this patients care, 

greater than 50% of time spent counseling, educating, and coordinating care 

regarding the above mentioned plan.


Objective: 





 Vital Signs (8 Hrs)











  Temp Pulse Resp BP Pulse Ox


 


 09/22/18 07:30  36.7 C  73  18  109/59 L  91 L


 


 09/22/18 04:00  36.2 C  73  21 H  124/62 H  94








 Intake/Output (24 Hrs)











 09/21/18 09/22/18 09/23/18





 05:59 05:59 05:59


 


Intake Total 500 175 


 


Output Total 1230 200 


 


Balance -730 -25 


 


Intake:   


 


  Oral (ml) 500 175 


 


Output:   


 


  Urine (ml) 1230 200 


 


    Catheter 1230  


 


    Urinal  200 


 


Other:   


 


  Intake Quantity  Yes 





  Sufficient   


 


  Number of Stools   


 


    Catheter 0  











Result Diagrams: 


 09/21/18 05:35





 09/22/18 03:35


Telemetry: 


sinus concetta with occasional PVCs.





- Physical Exam


Constitutional: WDWN


Eyes: PERRL, EOMI, anicteric sclera


Ears, Nose, Mouth, Throat: moist mucous membranes


Cardiovascular: regular rate and rhythm, no murmurs, no rubs


Respiratory: clear to auscultate bilat, no crackles, no wheezes


Gastrointestinal: normoactive bowel sounds


Musculoskeletal: other (groin site checked and has minimal bruising and no 

hematoma...)





ICD10 Worksheet


Patient Problems: 


 Problems











Problem Status Onset


 


Acute coronary syndrome Acute  


 


Dyspnea Acute  


 


STEMI (ST elevation myocardial infarction) Acute  


 


chronic disease mgmt/transitional care Acute

## 2018-09-23 VITALS — DIASTOLIC BLOOD PRESSURE: 61 MMHG | SYSTOLIC BLOOD PRESSURE: 100 MMHG

## 2018-09-23 LAB
INR PPP: 1.19 (ref 0.83–1.16)
PROTHROMBIN TIME: 15.3 SEC (ref 12–15)

## 2018-09-23 RX ADMIN — CARVEDILOL SCH MG: 3.12 TABLET, FILM COATED ORAL at 08:18

## 2018-09-23 RX ADMIN — WARFARIN SODIUM SCH MG: 5 TABLET ORAL at 12:38

## 2018-09-23 RX ADMIN — INSULIN LISPRO SCH UNITS: 100 INJECTION, SOLUTION INTRAVENOUS; SUBCUTANEOUS at 08:14

## 2018-09-23 RX ADMIN — INSULIN GLARGINE SCH UNITS: 100 INJECTION, SOLUTION SUBCUTANEOUS at 08:13

## 2018-09-23 RX ADMIN — PRASUGREL HYDROCHLORIDE SCH MG: 10 TABLET, FILM COATED ORAL at 08:17

## 2018-09-23 RX ADMIN — LISINOPRIL SCH MG: 5 TABLET ORAL at 08:19

## 2018-09-23 RX ADMIN — ASPIRIN SCH MG: 325 TABLET, DELAYED RELEASE ORAL at 08:17

## 2018-09-23 RX ADMIN — ENOXAPARIN SODIUM SCH MG: 100 INJECTION SUBCUTANEOUS at 08:18

## 2018-09-23 RX ADMIN — INSULIN LISPRO SCH UNITS: 100 INJECTION, SOLUTION INTRAVENOUS; SUBCUTANEOUS at 07:12

## 2018-09-23 NOTE — ASMTCAGE
CAGE

 

Do you feel you ought to      Answers:  Yes                                   

cut down on your drinking                                                     

or drug use?                                                                  

Do people annoy you by        Answers:  No                                    

criticizing your drinking                                                     

or drug use?                                                                  

Do you feel guilty about      Answers:  No                                    

your drinking or drug                                                         

use?                                                                          

Do you drink or use drugs     Answers:  No                                    

first thing in the                                                            

morning ([employer])?                                                         

Additional Comments           Pt has 3 drinks per day.  Each drink is 1 jigger of 

                              whiskey or gin.  declined resources, plans to cut 

                              back.

 

Date Signed:  09/23/2018 12:22 PM

Electronically Signed By:Nneka Acharya RN

## 2018-09-23 NOTE — ASDISCHSUM
----------------------------------------------

Discharge Information

----------------------------------------------

Plan Status:Home with No Needs                       Medically Cleared to Leave:09/23/2018

Discharge Date:09/23/2018                            CM D/C Disposition:Home, Routine, Self-Care

ADT D/C Disposition:Home, Routine, Self-Care         Projected Discharge Date:09/23/2018

Transportation at D/C:Family                         Discharge Delay Reason:

Follow-Up Date:09/23/2018                            Discharge Slot:

Final Diagnosis:Acute MI

----------------------------------------------

Placement Information

----------------------------------------------

----------------------------------------------

Patient Contact Information

----------------------------------------------

Contact Name:DEE                            Relationship:Wife

Address:[address]                                Home [phone]

                                                     Work [phone]

City:[place]                                         Alternate Phone:

State/Zip Code:[place] [address]                              Email:

----------------------------------------------

Financial Information

----------------------------------------------

Financial Class:Medicare

Primary Plan Desc:MEDICARE INPATIENT                 Primary Plan Number:[id number]

[facility]        Secondary Plan Number:[id number]

 

 

----------------------------------------------

Assessment Information

----------------------------------------------

----------------------------------------------

LACE

----------------------------------------------

LACE

 

Length of stay for            Answers:  3 days                                

current admission                                                             

Acuity / Level of             Answers:  Yes                                   

Care: Did the patient                                                         

have an inpatient                                                             

admission?                                                                    

Comorbidities - select        Answers:  Coronary Artery Disease               

all that apply                                                                

                                        Diabetes (uncontrolled or             

                                        controlled)                           

                                        Previous myocardial                   

                                        infarction                            

                                        Other                         Notes:  Hyperlipidemia

# of Emergency department     Answers:  1-2                                   

visits in the last 6                                                          

months                                                                        

Score: 12

 

Date Signed:  09/23/2018 12:22 PM

Electronically Signed By:Nneka Acharya RN

 

 

----------------------------------------------

[facility]LV[facility] Progress Note

----------------------------------------------

CM Note

 

CM Note                       

Notes:

72yr old male admitted for an acute MI. He has a Hx of ACS, CAD, Dyspnea, HTN, DM-1, forner 

smoker. Patient went to the cath lab found to have occluded LAD . Patient was stented and on the 

bolloon pump. Lives with his wife. ÁLVARO to follow, may not have discharge needs.

 

Date Signed:  09/19/2018 03:40 PM

Electronically Signed By:Johanna Dobson LCSW

 

 

----------------------------------------------

[facility] CM Progress Note

----------------------------------------------

CM Note

 

CM Note                       

Notes:

No therapies ordered for patient. Patient to d/c independently. CM available should d/c needs 

arise.

 

Date Signed:  09/21/2018 11:33 AM

Electronically Signed By:Iveth Wakefield LCSW

 

 

----------------------------------------------

CAGE Questionnaire

----------------------------------------------

CAGE

 

Do you feel you ought to      Answers:  Yes                                   

cut down on your drinking                                                     

or drug use?                                                                  

Do people annoy you by        Answers:  No                                    

criticizing your drinking                                                     

or drug use?                                                                  

Do you feel guilty about      Answers:  No                                    

your drinking or drug                                                         

use?                                                                          

Do you drink or use drugs     Answers:  No                                    

first thing in the                                                            

morning ([employer])?                                                         

Additional Comments           Pt has 3 drinks per day.  Each drink is 1 jigger of 

                              whiskey or gin.  declined resources, plans to cut 

                              back.

 

Date Signed:  09/23/2018 12:22 PM

Electronically Signed By:Nneka Acharya RN

 

 

----------------------------------------------

Case Management Discharge Plan Note

----------------------------------------------

Case Management Discharge

 

Discharge Order Complete?     Answers:  Yes                                   

Patient to Obtain             Answers:  Independently                         

Medications                                                                   

Transportation Arranged       Answers:  Family/Friends                        

Family Notified               Answers:  Yes                           Notes:  in room

Discharge Comments            

Notes:

9/23/2018 Case Management Note



Met w/pt and wife.  CAGE completed.  IM signed.  There are no case management d/c needs 

identified.  Pt to discharge independent with follow up as directed.

 

Date Signed:  09/23/2018 12:24 PM

Electronically Signed By:Nneka Acharya RN

 

 

----------------------------------------------

Intervention Information

----------------------------------------------

Intervention Type:*MENG-Signed                       Date of Service:09/19/2018 11:18 AM

Patient Type:Observation                             Staff Member:Brook Criag

Hours:                                               Discipline:

Severity:                                            Comment:

Intervention Type:*IM-Signed                         Date of Service:09/23/2018 12:24 PM

Patient Type:Inpatient                               Staff Member:SHAQUILLE Acharya, Nneka

Hours:                                               Discipline:

Severity:                                            Comment:

## 2018-09-23 NOTE — ASMTDCNOTE
Case Management Discharge

 

Discharge Order Complete?     Answers:  Yes                                   

Patient to Obtain             Answers:  Independently                         

Medications                                                                   

Transportation Arranged       Answers:  Family/Friends                        

Family Notified               Answers:  Yes                           Notes:  in room

Discharge Comments            

Notes:

9/23/2018 Case Management Note



Met w/pt and wife.  CAGE completed.  IM signed.  There are no case management d/c needs 

identified.  Pt to discharge independent with follow up as directed.

 

Date Signed:  09/23/2018 12:24 PM

Electronically Signed By:Nneka Acharya RN

## 2018-09-23 NOTE — ASMTLACE
LACE

 

Length of stay for            Answers:  3 days                                

current admission                                                             

Acuity / Level of             Answers:  Yes                                   

Care: Did the patient                                                         

have an inpatient                                                             

admission?                                                                    

Comorbidities - select        Answers:  Coronary Artery Disease               

all that apply                                                                

                                        Diabetes (uncontrolled or             

                                        controlled)                           

                                        Previous myocardial                   

                                        infarction                            

                                        Other                         Notes:  Hyperlipidemia

# of Emergency department     Answers:  1-2                                   

visits in the last 6                                                          

months                                                                        

Score: 12

 

Date Signed:  09/23/2018 12:22 PM

Electronically Signed By:Nneka Acharya RN

## 2018-09-23 NOTE — PDHOMEO2F
Home Oxygen Face to Face


Home Orders: 


I certify that a physician or a nurse practitioner or physician's assistant has 

had a face-to-face encounter with this patient on the date of this order due to 

the diagnosis listed, which relates to the primary reason the patient requires 

home oxygen. Alternative treatments have been tried, or considered, and deemed 

ineffective. It is anticipated that supplemental oxygen will result in 

improvement with treatment.





Home oxygen qualifying diagnosis: hypoxemia


Home oxygen secondary diagnosis: heart failure, acute systolic 


SpO2 on room air (%): 74


PaO2 on room air (mmHG): 59


Frequency of home oxygen needed: during sleep


Home oxygen liters per minute: 2


Home oxygen delivery device: nasal cannula


Concentrator: Yes


E-tanks for mobility and back up: No


I certify that, based on these findings, the home oxygen is medically necessary 

for this patient for the following length of time.


I think the time is indeterminate and likely to be lifelong as the patient 

likely has chronic nocturnal hypoxemia that predated his heart attack.


Length of time home oxygen needed: 3 months (This should give adequate time for 

an outpatient assessment for central apnea to take place.)


Home Oxygen Comment: Patient will need outpatient sleep evaluation for central 

apnea. the patient has had a heart attack and has relatively poor LV function 

and also has serologic evidence of heart failure with elevated BNP

## 2018-10-03 ENCOUNTER — HOSPITAL ENCOUNTER (EMERGENCY)
Dept: HOSPITAL 80 - FED | Age: 73
Discharge: HOME | End: 2018-10-03
Payer: COMMERCIAL

## 2018-10-03 VITALS — SYSTOLIC BLOOD PRESSURE: 138 MMHG | DIASTOLIC BLOOD PRESSURE: 73 MMHG

## 2018-10-03 DIAGNOSIS — R04.0: Primary | ICD-10-CM

## 2018-10-03 DIAGNOSIS — Z79.01: ICD-10-CM

## 2018-10-03 LAB
INR PPP: 2.37 (ref 0.83–1.16)
PLATELET # BLD: 262 10^3/UL (ref 150–400)
PROTHROMBIN TIME: 25.9 SEC (ref 12–15)

## 2018-10-03 PROCEDURE — 2Y41X5Z PACKING OF NASAL REGION USING PACKING MATERIAL: ICD-10-PCS

## 2018-10-03 NOTE — EDPHY
H & P


Time Seen by Provider: 10/03/18 11:50


HPI/ROS: 





CHIEF COMPLAINT:  Recurrent epistaxis





HISTORY OF PRESENT ILLNESS:  72-year-old male history of nighttime home oxygen, 

recent MI with initiation of Coumadin therapy, in the ER complaining of 

recurrent epistaxis for the past 2 days.  No digital trauma.  No dizziness.  No 

headache.  No chest pain.  No dyspnea.





PRIMARY CARE PROVIDER:  





REVIEW OF SYSTEMS:


10 systems reviewed and are negative with exception of illness mentioned in the 

history of present illness





************


PHYSICAL EXAM





(Prior to examination, patient consented to physical exam, hands were washed 

and my usual and customary physical exam procedures followed)


1) GENERAL: Well-developed, well-nourished, alert and oriented.  Appears to be 

in no acute distress.


2) HEAD: Normocephalic


3) HEENT: sclera anicteric.  Nasal clip in place taken down revealing area of 

friability with no active bleeding right Kiesselbach's plexus.   


4) LUNGS: Breathing comfortably.   








Smoking Status: Never smoked


Constitutional: 


 Initial Vital Signs











Temperature (C)  36.4 C   10/03/18 11:15


 


Heart Rate  71   10/03/18 11:15


 


Respiratory Rate  18   10/03/18 11:15


 


Blood Pressure  110/56 L  10/03/18 11:15


 


O2 Sat (%)  98   10/03/18 11:15








 











O2 Delivery Mode               Room Air














Allergies/Adverse Reactions: 


 





Sulfa (Sulfonamide Antibiotics) Allergy (Verified 10/03/18 11:14)


 


tetracycline [Tetracycline] Allergy (Verified 10/03/18 11:14)


 


contrast Allergy (Uncoded 02/05/14 09:02)


 








Home Medications: 














 Medication  Instructions  Recorded


 


Atorvastatin Calcium [Lipitor 40 40 mg PO DAILY 09/18/18





mg (*)]  


 


Insulin Glargine [Lantus] 3 unit SC HS 09/18/18


 


Insulin Glargine [Lantus] 5 unit SC DAILY 09/18/18


 


Insulin Aspart [novoLOG] 2 - 20 unit SC AC 09/22/18


 


Aspirin EC [Aspirin  mg (*)] 325 mg PO DAILY  tab 09/23/18


 


Carvedilol [Coreg (*)] 3.125 mg PO BIDMEAL #120 tab 09/23/18


 


Enoxaparin [Lovenox 80 MG (*)] 80 mg SC BID #14 syr 09/23/18


 


Eplerenone [Inspra 25 MG (*)] 25 mg PO DAILY #90 tab 09/23/18


 


Lisinopril [Zestril 5 mg (*)] 5 mg PO DAILY #90 tab 09/23/18


 


Prasugrel HCl [Effient 10mg (*)] 10 mg PO DAILY #90 tab 09/23/18


 


Warfarin Sodium [Coumadin 5MG (*)] 5 mg PO DAILY16 #60 tab 09/23/18














MDM/Departure





- [facility]


Procedures: 





Procedure: Epistaxis control.


Indication:  Recurrent epistaxis, on Coumadin


Risks, benefits, alternatives discussed with patient and consent obtained.  The 

right nostril was packed with TXA.  Patient was observed for a period of time.  

The packing was removed and patient is hemostatic.


Medications Given: 


 








Discontinued Medications





Tranexamic Acid (Cyklokapron)  500 mg TP EDNOW ONE


   Stop: 10/03/18 11:52


   Last Admin: 10/03/18 12:12 Dose:  500 mg





ED Course/Re-evaluation: 





1:06 p.m.:  Re-evaluation.  TXA soaked pledget removed.  Has been in place for 

approximately 30 min.  Hemostasis at this time.  Discussed with patient that 

there is no guarantee that he will not have really bleeding.  I offered to 

place a nasal pledgets however he declines this.  Usual customary epistaxis 

precautions and instructions provided.  He feels comfortable being discharged.  

I saw this patient independently based on established practice protocols.  Care 

of patient under supervision of  secondary supervising physician Dr McCollester 

.








1:20 p.m.:  Patient was being discharged in walking out of the ER he had 

recurrence of bleeding.  Subsequently, rapid rhino nasal packing placed by 

myself using usual and customary technique resulting in hemostasis.  Today is 

Wednesday.  He will need follow-up with [facility] on Friday.





- Depart


Disposition: Home, Routine, Self-Care


Clinical Impression: 


 Epistaxis, recurrent





Condition: Good


Instructions:  Nosebleed (ED)


Additional Instructions: 


If you develop further episodes of nosebleed, place direct pressure for 20 

minutes. If the bleeding continues, seek medical attention.  Do not blow your 

nose, do not pick your nose, avoid bearing down.


Referrals: 


Juan Francisco Sams MD [Medical Doctor] - 10/05/18 (followup with Dr Sams or Bala 

on Friday)

## 2018-11-05 ENCOUNTER — HOSPITAL ENCOUNTER (OUTPATIENT)
Dept: HOSPITAL 80 - BHFA | Age: 73
End: 2018-11-05
Attending: INTERNAL MEDICINE
Payer: COMMERCIAL

## 2018-11-05 DIAGNOSIS — I42.9: ICD-10-CM

## 2018-11-05 DIAGNOSIS — I25.10: Primary | ICD-10-CM

## 2019-03-26 ENCOUNTER — HOSPITAL ENCOUNTER (OUTPATIENT)
Dept: HOSPITAL 80 - BHFA | Age: 74
End: 2019-03-26
Attending: INTERNAL MEDICINE
Payer: COMMERCIAL

## 2019-03-26 DIAGNOSIS — I25.10: Primary | ICD-10-CM
